# Patient Record
Sex: MALE | Race: WHITE
[De-identification: names, ages, dates, MRNs, and addresses within clinical notes are randomized per-mention and may not be internally consistent; named-entity substitution may affect disease eponyms.]

---

## 2019-01-21 ENCOUNTER — HOSPITAL ENCOUNTER (OUTPATIENT)
Dept: HOSPITAL 84 - D.OPS | Age: 69
Discharge: HOME | End: 2019-01-21
Attending: UROLOGY
Payer: COMMERCIAL

## 2019-01-21 VITALS — BODY MASS INDEX: 34 KG/M2

## 2019-01-21 DIAGNOSIS — Z01.812: ICD-10-CM

## 2019-01-21 DIAGNOSIS — N40.1: Primary | ICD-10-CM

## 2019-01-21 DIAGNOSIS — Z53.09: ICD-10-CM

## 2019-01-21 LAB
ERYTHROCYTE [DISTWIDTH] IN BLOOD BY AUTOMATED COUNT: 14.7 % (ref 11.5–14.5)
HCT VFR BLD CALC: 43.1 % (ref 42–54)
HGB BLD-MCNC: 14.7 G/DL (ref 13.5–17.5)
MCH RBC QN AUTO: 30.1 PG (ref 26–34)
MCHC RBC AUTO-ENTMCNC: 34.1 G/DL (ref 31–37)
MCV RBC: 88.1 FL (ref 80–100)
PLATELET # BLD: 224 10X3/UL (ref 130–400)
PMV BLD AUTO: 9 FL (ref 7.4–10.4)
RBC # BLD AUTO: 4.89 10X6/UL (ref 4.2–6.1)
WBC # BLD AUTO: 12.9 10X3/UL (ref 4.8–10.8)

## 2019-01-21 NOTE — NUR
PAN NOTE: EKG REVIEWED BY DR. DAWKINS. STATES PATIENT NEEDS EKG CLEARED BY
CARDIOLOGY. RITO AT DR. QUEZADA'S OFFICE NOTIFIED. APPOINTMENT WITH
CARDIOLOGY SET FOR 1/30 AT 0930. [facility]. EKG IMAGES FAXED TO  CARDIOLOGY.

## 2019-02-26 LAB
ERYTHROCYTE [DISTWIDTH] IN BLOOD BY AUTOMATED COUNT: 14.9 % (ref 11.5–14.5)
HCT VFR BLD CALC: 40.3 % (ref 42–54)
HGB BLD-MCNC: 13.7 G/DL (ref 13.5–17.5)
MCH RBC QN AUTO: 30 PG (ref 26–34)
MCHC RBC AUTO-ENTMCNC: 34 G/DL (ref 31–37)
MCV RBC: 88.2 FL (ref 80–100)
PLATELET # BLD: 209 10X3/UL (ref 130–400)
PMV BLD AUTO: 8.7 FL (ref 7.4–10.4)
RBC # BLD AUTO: 4.57 10X6/UL (ref 4.2–6.1)
WBC # BLD AUTO: 9.1 10X3/UL (ref 4.8–10.8)

## 2019-02-28 ENCOUNTER — HOSPITAL ENCOUNTER (OUTPATIENT)
Dept: HOSPITAL 84 - D.OPS | Age: 69
Discharge: HOME | End: 2019-02-28
Attending: UROLOGY
Payer: COMMERCIAL

## 2019-02-28 VITALS — HEIGHT: 72 IN | BODY MASS INDEX: 34.13 KG/M2 | WEIGHT: 252 LBS

## 2019-02-28 VITALS — DIASTOLIC BLOOD PRESSURE: 90 MMHG | SYSTOLIC BLOOD PRESSURE: 153 MMHG

## 2019-02-28 DIAGNOSIS — Z88.0: ICD-10-CM

## 2019-02-28 DIAGNOSIS — Z01.812: ICD-10-CM

## 2019-02-28 DIAGNOSIS — N32.0: ICD-10-CM

## 2019-02-28 DIAGNOSIS — N40.1: Primary | ICD-10-CM

## 2019-02-28 DIAGNOSIS — Z88.1: ICD-10-CM

## 2019-02-28 DIAGNOSIS — N13.8: ICD-10-CM

## 2019-03-01 NOTE — OP
PATIENT NAME:  KELVIN LYLES                          MEDICAL RECORD: [id number]
:50                                             LOCATION:D[facility]          
                                                         ADMISSION DATE:        
SURGEON:  WILLIAM QUEZADA MD              
 
 
DATE OF OPERATION:  2019
 
SURGEON:  William Quezada MD
 
ANESTHESIA:  TIVA by Enmanuel Zaldivar CRNA
 
DIAGNOSES:  Obstructive BPH with 40 mL prostate on URIEL, IPSS equals 12, and
quality of life score equals 4.  These scores are with the patient on
finasteride and tamsulosin for over one year.
 
PROCEDURE:  UroLift times 4 implants.
 
FINDINGS:  Bilateral lateral lobes which are obstructive.  Minimal median lobe
present.  Single ureteral orifices bilaterally without any bladder tumors.
 
BLOOD LOSS:  Minimal.
 
CLINICAL HISTORY:  This is a 68-year-old male who has bladder outlet
obstruction.  He has been on tamsulosin and finasteride for over one year and he
still has significant voiding symptoms.  He comes now to have UroLift procedure
done.  HE IS ALLERGIC TO FLAGYL AND PENICILLIN.  We gave him Levaquin on-call to
the OR.
 
DESCRIPTION OF PROCEDURE:  The patient was given IV sedation.  He was then
placed in the dorsal lithotomy position and prepped and draped.  The UroLift
scope was introduced with the findings as outlined above.  We first inserted the
2 bladder neck level units in the anterolateral prostatic urethra.  These were
placed about 1.5 cm distal to the bladder neck.  Then, we finally placed 2 more
units at the level of the verumontanum, at the anterolateral prostatic urethra. 
He had a nice wide open of anterior urethral channel.  The bladder was emptied
through the scope and then the scope was removed.  The patient was brought back
to the preoperative holding area.
 
I will see him back in followup in one month's time.
 
TRANSINT:[id number] Voice Confirmation ID: [id number] DOCUMENT ID: [id number]
                                           
                                           WILLIAM QUEZADA MD              
 
 
 
Electronically Signed by WILLIAM QUEZADA on 19 at 0902
 
 
CC:                                                             [id number]
DICTATION DATE: 19 1011     :     19 1835      [facility] 
                                                                      [id number] [place] [address]

## 2019-07-19 ENCOUNTER — HOSPITAL ENCOUNTER (OUTPATIENT)
Dept: HOSPITAL 84 - D.US | Age: 69
Discharge: HOME | End: 2019-07-19
Attending: FAMILY MEDICINE
Payer: MEDICARE

## 2019-07-19 VITALS — BODY MASS INDEX: 34.2 KG/M2

## 2019-07-19 DIAGNOSIS — R60.0: Primary | ICD-10-CM

## 2021-06-22 ENCOUNTER — HOSPITAL ENCOUNTER (OUTPATIENT)
Dept: HOSPITAL 84 - D.HCCECHO | Age: 71
Discharge: HOME | End: 2021-06-22
Attending: INTERNAL MEDICINE
Payer: MEDICARE

## 2021-06-22 VITALS — BODY MASS INDEX: 34.2 KG/M2

## 2021-06-22 DIAGNOSIS — I10: Primary | ICD-10-CM

## 2021-06-22 DIAGNOSIS — I20.9: ICD-10-CM
